# Patient Record
(demographics unavailable — no encounter records)

---

## 2024-10-24 NOTE — HISTORY OF PRESENT ILLNESS
[FreeTextEntry1] : Mr. MO is a 63 year old male with hx of Anti MPO renal limited vasculitis here for evaluation for ongoing ALONZO as a GN opinion last in May 2023.   In 2016, he had dx of ALONZO and hematuria and dx of anca vasculitis. 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 He had presented with sinusitis and hematuria and crt in 3.6 range. He had a + MPO titer. He was 1.8gm of proteinuria then. He had large kidenys in 13cm range and the mass was 3.2cm. He had milld IFTA and he got 4 doses of rituximab and steroids and did well with crt down to 1.0mg/dl range. He had bx read at [facility] and f.u with Dr Deejay Walls. He then got 4 more doses of rituxan over 1 year whenever his MPO titers josy. 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 The report does say sampling was a concern and only 1 glomeruli in the sample for IF but no active MPO disease. Since then, he has rising crt and now in 2.4 range and urine with hematuria and RBCS casts. He is here for potential re biopsy and opinion. New drugs- allopurinol since 2021 but otherwise mostly losartan, and bp meds. No hydralazine. No RA drugs. Never seen a rheumatologist. No lung findings. No recent COVID vaccine or infection. Most recent crt May 12th 2023- 2.4, 2gm of proteinuria and MPO titer is 7.7( very high in that range)-scanned  Since then, Pt was admitted, got 3 days of solumedrol 250mg IV pulse and 1gm of Rituxan as well Sent home on prednisone taper, dc crt was 2.5mg.dl. Sent on prednisone 60mg ( first week), 30mg ( second week), 25mg ( 2 weeks), 20mg( 2 weeks), 15mg ( 2weeks), 10mg (2 weeks), 5mg( 3 weeks). He got his second dose of rituxan as outpatient June 15th 2023. Overall, feels well, lost wt and feels lightheaded at home sometimes, bp running low He also has cramping at night of legs and thigh.  9/18/23: feeling overall well. Stopped prednisone and started avacopan early September. Tolerating avacopan well with no side effects. BP has been well controlled at home in the 120s.  Last labs from Aug 2023 show improvement in creat to 1.65 and downtrending proteinuria to 0.6  12/18/2023: Started Avacopan 30mg BID in Oct 2023. No side effects. Received 1st rituximab maintenance dose in Nov 2023. Remains off steroids. BP well controlled at home. Minimal edema/foamy urine.   June 2024- doing well. Proteinuria down trending. now on losartan 75mg po qd. BP doing well. 110-130 SBP at home No edema. ANCA titers improving. Planned for next Rituxan in july 2024  10/2024: Since his last visit: He received rituximab 1 g x1 on 7/11/2024 He has been compliant with avacopan, losartan, amlodipine He takes atovaquone ~ 3x/week Denies acute illness, hospitalizations Denies side effects from avacopan including GI symptoms, abd pain, rash, f/c, infections Home /70-80s  No recent labs

## 2024-10-24 NOTE — END OF VISIT
[] : Fellow [FreeTextEntry3] : Pt doing overall and encouraged to go back to [facility] nephrologist Dr Young and willem wren

## 2024-10-24 NOTE — ASSESSMENT
[FreeTextEntry1] : Mr. MO returns for follow up of renal limited MPO-ANVA vasculitis. 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 He had crescents and ANCA disease, minimal IFTA and started steroids in May and got two doses of rituxan two weeks apart, last dose Myriam 15, 2023. He got 2 doses of IV rituxan 1gm 2 weeks apart and mini pulse steroids 250mg IV X 3 doses. Followed by prednisone 60mg (first week), 30mg (second week), 25mg (2 weeks), 20mg (2 weeks), 15mg (2weeks), 10mg (2 weeks), 5mg (3 weeks).  Despite treatment, elevated Cr and proteinuria persisted (Cr 2.2 and uPCR 1.8 g/g). He was started on Avacopan in October 2023 and stopped prednisone. Additionally, he received rituximab 1g x1 in 7/2024. He has been tolerating treatment well and clinically responding to treatment. Most recent available Cr is down to 1.4 and MPO  is also trending down (26, from peak 50s).  1. MPO- ANCA vasculitis: Renal limited. Last relapse in 5/2023 now s/p rituximab (last dose 1g x1 in 7/2024) and avacopan started in 10/2023. He is tolerating treatment well without of signs or symptoms of side effects. He appears to be responding to treatment well with down-trending Cr and MPO titer. No recent uPCR. - Continue with avacopan, Losartan  - Will need another dose of rituximab, ~ 1/2025, will place order (for GI infusion center downstairs) - Check CMP, uPCR, UA, ANCA titers, CBC, LFTs - Advised to take atovaquone daily as prescribed  2. HTN: Well controlled on losartan and amlodipine. - Continue both for now - If edema worsens or becomes uncomfortable, will reduce dose of amlodipine - Advised low sodium diet - Advised to continue to monitor home BP  F/U in 4 months.

## 2024-10-24 NOTE — HISTORY OF PRESENT ILLNESS
[FreeTextEntry1] : Mr. MO is a 63 year old male with hx of Anti MPO renal limited vasculitis here for evaluation for ongoing ALONZO as a GN opinion last in May 2023.   In 2016, he had dx of ALONZO and hematuria and dx of anca vasculitis. 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 He had presented with sinusitis and hematuria and crt in 3.6 range. He had a + MPO titer. He was 1.8gm of proteinuria then. He had large kidenys in 13cm range and the mass was 3.2cm. He had milld IFTA and he got 4 doses of rituximab and steroids and did well with crt down to 1.0mg/dl range. He had bx read at [facility] and f.u with Dr Deejay Walls. He then got 4 more doses of rituxan over 1 year whenever his MPO titers josy. 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 The report does say sampling was a concern and only 1 glomeruli in the sample for IF but no active MPO disease. Since then, he has rising crt and now in 2.4 range and urine with hematuria and RBCS casts. He is here for potential re biopsy and opinion. New drugs- allopurinol since 2021 but otherwise mostly losartan, and bp meds. No hydralazine. No RA drugs. Never seen a rheumatologist. No lung findings. No recent COVID vaccine or infection. Most recent crt May 12th 2023- 2.4, 2gm of proteinuria and MPO titer is 7.7( very high in that range)-scanned  Since then, Pt was admitted, got 3 days of solumedrol 250mg IV pulse and 1gm of Rituxan as well Sent home on prednisone taper, dc crt was 2.5mg.dl. Sent on prednisone 60mg ( first week), 30mg ( second week), 25mg ( 2 weeks), 20mg( 2 weeks), 15mg ( 2weeks), 10mg (2 weeks), 5mg( 3 weeks). He got his second dose of rituxan as outpatient June 15th 2023. Overall, feels well, lost wt and feels lightheaded at home sometimes, bp running low He also has cramping at night of legs and thigh.  9/18/23: feeling overall well. Stopped prednisone and started avacopan early September. Tolerating avacopan well with no side effects. BP has been well controlled at home in the 120s.  Last labs from Aug 2023 show improvement in creat to 1.65 and downtrending proteinuria to 0.6  12/18/2023: Started Avacopan 30mg BID in Oct 2023. No side effects. Received 1st rituximab maintenance dose in Nov 2023. Remains off steroids. BP well controlled at home. Minimal edema/foamy urine.   June 2024- doing well. Proteinuria down trending. now on losartan 75mg po qd. BP doing well. 110-130 SBP at home No edema. ANCA titers improving. Planned for next Rituxan in july 2024  10/2024: Since his last visit: He received rituximab 1 g x1 on 7/11/2024 He has been compliant with avacopan, losartan, amlodipine He takes atovaquone ~ 3x/week Denies acute illness, hospitalizations Denies side effects from avacopan including GI symptoms, abd pain, rash, f/c, infections Home /70-80s  No recent labs

## 2025-01-10 NOTE — HISTORY OF PRESENT ILLNESS
[N/A] : N/A [Denies] : Denies [No] : No [Yes] : Yes [Informed consent documented in EHR.] : Informed consent documented in EHR. [TB] : Tuberculosis screening [Left upper extremity] : Left upper extremity [24g] : 24g [Start Time: ___] : Medication Start Time: [unfilled] [Medication Name: ___] : Medication Name: [unfilled] [Total Amount Administered: ___] : Total Amount Administered: [unfilled] [Patient  instructed to seek medical attention with signs and symptoms of adverse effects] : Patient  instructed to seek medical attention with signs and symptoms of adverse effects [Patient left unit in no acute distress] : Patient left unit in no acute distress [Medications administered as ordered and tolerated well.] : Medications administered as ordered and tolerated well. [End Time: ___] : Medication End Time: [unfilled] [IV discontinued. Intact. No signs or symptoms of IV complications noted. Time: ___] : IV discontinued. Intact. No signs or symptoms of IV complications noted. Time: [unfilled] [de-identified] : left AC [de-identified] : 8:27 AM [de-identified] : Patient presents for RItuxan infusion. Denies recent infection or hospitalization. Signs and symptoms of possible adverse reactions reinforced with patient. Pre-medications administered as ordered. Tolerated infusion well.

## 2025-01-10 NOTE — DISCUSSION/SUMMARY
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  Pt scheduled for Rituxan today  Pre medicated with IVF and pre meds -  D/w Dr Franco - no need for labs today  He is scheduled to f/u with Dr Franco in feb  He tolerated infusion well with no adverse effects

## 2025-02-12 NOTE — ASSESSMENT
[FreeTextEntry1] : Mr. MO returns for follow up of renal limited MPO-ANVA vasculitis. 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 He had crescents and ANCA disease, minimal IFTA and started steroids in May and got two doses of rituxan two weeks apart, last dose Myriam 15, 2023. He got 2 doses of IV rituxan 1gm 2 weeks apart and mini pulse steroids 250mg IV X 3 doses. Followed by prednisone 60mg (first week), 30mg (second week), 25mg (2 weeks), 20mg (2 weeks), 15mg (2weeks), 10mg (2 weeks), 5mg (3 weeks).  Despite treatment, elevated Cr and proteinuria persisted (Cr 2.2 and uPCR 1.8 g/g). He was started on Avacopan in October 2023 and stopped prednisone. Additionally, he received rituximab 1g x1 in 7/2024 and 1/2025. He has been tolerating treatment well and clinically responding to treatment. Most recent available Cr is down to 1.4 and MPO  is also trending down (26, from peak 50s).  1. MPO- ANCA vasculitis: Renal limited. Last relapse in 5/2023 now s/p rituximab x5 (last dose 1g x1 in 1/2025) and avacopan started in 10/2023. He is tolerating treatment well without of signs or symptoms of side effects. He appears to be responding to treatment well with down-trending Cr and MPO titer. No recent uPCR. - Continue with avacopan, Losartan  - Will aim for rituximab yearly for now based on clinical and laboratory assessment  - Check CMP, uPCR, UA, ANCA titers, CBC - LFTs for avacopan toxicity - Advised to take atovaquone daily as prescribed  2. HTN: In office BP 150s, on losartan and amlodipine. He has edema probably from amlodipine as his diet is low in sodium. - Continue both for now - If edema worsens or becomes uncomfortable, will reduce dose of amlodipine - Advised low sodium diet - Advised to continue to monitor home BP  F/U in 6 months.

## 2025-02-12 NOTE — HISTORY OF PRESENT ILLNESS
[FreeTextEntry1] : Mr. MO is a 64 year old male with hx of Anti MPO renal limited vasculitis here for evaluation for ongoing ALONZO as a GN opinion last in May 2023.   In 2016, he had dx of ALONZO and hematuria and dx of anca vasculitis. 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 He had presented with sinusitis and hematuria and crt in 3.6 range. He had a + MPO titer. He was 1.8gm of proteinuria then. He had large kidenys in 13cm range and the mass was 3.2cm. He had milld IFTA and he got 4 doses of rituximab and steroids and did well with crt down to 1.0mg/dl range. He had bx read at [facility] and f.u with Dr Deejay Walls. He then got 4 more doses of rituxan over 1 year whenever his MPO titers josy. 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 The report does say sampling was a concern and only 1 glomeruli in the sample for IF but no active MPO disease. Since then, he has rising crt and now in 2.4 range and urine with hematuria and RBCS casts. He is here for potential re biopsy and opinion. New drugs- allopurinol since 2021 but otherwise mostly losartan, and bp meds. No hydralazine. No RA drugs. Never seen a rheumatologist. No lung findings. No recent COVID vaccine or infection. Most recent crt May 12th 2023- 2.4, 2gm of proteinuria and MPO titer is 7.7( very high in that range)-scanned  Since then, Pt was admitted, got 3 days of solumedrol 250mg IV pulse and 1gm of Rituxan as well Sent home on prednisone taper, dc crt was 2.5mg.dl. Sent on prednisone 60mg ( first week), 30mg ( second week), 25mg ( 2 weeks), 20mg( 2 weeks), 15mg ( 2weeks), 10mg (2 weeks), 5mg( 3 weeks). He got his second dose of rituxan as outpatient June 15th 2023. Overall, feels well, lost wt and feels lightheaded at home sometimes, bp running low He also has cramping at night of legs and thigh.  9/18/23: feeling overall well. Stopped prednisone and started avacopan early September. Tolerating avacopan well with no side effects. BP has been well controlled at home in the 120s.  Last labs from Aug 2023 show improvement in creat to 1.65 and downtrending proteinuria to 0.6  12/18/2023: Started Avacopan 30mg BID in Oct 2023. No side effects. Received 1st rituximab maintenance dose in Nov 2023. Remains off steroids. BP well controlled at home. Minimal edema/foamy urine.   June 2024- doing well. Proteinuria down trending. now on losartan 75mg po qd. BP doing well. 110-130 SBP at home No edema. ANCA titers improving. Planned for next Rituxan in july 2024  10/2024: Since his last visit: He received rituximab 1 g x1 on 7/11/2024 He has been compliant with avacopan, losartan, amlodipine He takes atovaquone ~ 3x/week Denies acute illness, hospitalizations Denies side effects from avacopan including GI symptoms, abd pain, rash, f/c, infections Home /70-80s  2/2025 Since his last visit: He received rituximab 1 g x1 on 1/10/2025 (previous dose 7/11/2024) He has been compliant with avacopan, losartan, amlodipine He takes atovaquone ~ 3x/week Denies acute illness, hospitalizations Denies side effects from avacopan including GI symptoms, abd pain, rash, f/c, infections Home BP - does not check

## 2025-07-16 NOTE — HISTORY OF PRESENT ILLNESS
[FreeTextEntry1] : Mr. MO is a 64 year old male with hx of Anti MPO renal limited vasculitis here for here for follow up.  ANCA vasculitis history: 2016: Diagnosed with MPO-ANCA positive pauci-immune crescentic glomerulonephritis when he presented with ALONZO (creatinine 3.6 mg/dL), hematuria, sinusitis, proteinuria (1.8g).He was found to have a right renal oncocytoma resected; non-neoplastic tissue confirmed vasculitis.  Kidneys enlarged (13cm), mass 3.2 cm. Mild IFTA. MPO titer positive. Treated with rituximab (4 doses) and steroids, achieving remission (creatinine 1.0 mg/dL).   [id number]: Received additional rituximab doses based on rising MPO titers. Started allopurinol in 2021. Maintained on losartan and blood pressure medications.  January 2023: Relapse with rising creatinine (1.3-1.5 mg/dL) and MPO titers. Repeat renal biopsy: focal global glomerulosclerosis, mild IFTA, no active vasculitis (limited sampling).  May 2023: Creatinine 2.4 mg/dL, proteinuria 2g, elevated MPO titer (7.7). Hospitalized for IV steroids and rituximab. Discharged on prednisone taper.  June 2023: Second rituximab dose administered as outpatient. Creatinine 2.5 mg/dL at discharge. Reports weight loss and lightheadedness, leg cramps.  September 2023: Started avacopan, tolerating well.  Stopped prednisone. Blood pressure well controlled.  August 2023:Creatinine improved to 1.65 mg/dL, proteinuria decreased to 0.6g.  November 2023: Received rituximab maintenance dose.  Remains off steroids. Minimal edema/foamy urine.  June 2024:Doing well, proteinuria down trending.  On losartan 75mg daily. Tolerating avacopan. Blood pressure well controlled (110-130 mmHg systolic). No edema.  ANCA titers improving.  July 2024: Received rituximab 1g. Continues avacopan, losartan, amlodipine, and atovaquone prophylaxis.  October 2024: Continues medications, denies side effects. Home blood pressure 130/70-80s mmHg.  January 2025: He received rituximab 1 g x1 on 1/10/2025 (previous dose 7/11/2024). Continues medications as above. No reported side effects. Does not check blood pressure at home.  July 2025: Continued medications, tolerating well. BP is usually in the 130s/80s. Today it is 136/83mmhg.  He has been compliant with avacopan, losartan, amlodipine Denies acute illness, hospitalizations Denies side effects from avacopan including GI symptoms, abd pain, rash, f/c, infections current Meds: amlodipine 10, atovaquone 750, doxazosin 4mg, fenofibrate 48mg, losartan 75mg, avacopan 30 bid

## 2025-07-16 NOTE — PHYSICAL EXAM
[General Appearance - Alert] : alert [General Appearance - In No Acute Distress] : in no acute distress [General Appearance - Well Nourished] : well nourished [General Appearance - Well Developed] : well developed [Sclera] : the sclera and conjunctiva were normal [Outer Ear] : the ears and nose were normal in appearance [Neck Appearance] : the appearance of the neck was normal [] : no respiratory distress [Respiration, Rhythm And Depth] : normal respiratory rhythm and effort [Auscultation Breath Sounds / Voice Sounds] : lungs were clear to auscultation bilaterally [Heart Rate And Rhythm] : heart rate was normal and rhythm regular [Heart Sounds] : normal S1 and S2 [Abdomen Soft] : soft [No CVA Tenderness] : no ~M costovertebral angle tenderness [No Spinal Tenderness] : no spinal tenderness [Abnormal Walk] : normal gait [Skin Color & Pigmentation] : normal skin color and pigmentation [Oriented To Time, Place, And Person] : oriented to person, place, and time

## 2025-07-16 NOTE — ASSESSMENT
[FreeTextEntry1] : 64 year old male with hx of Anti MPO renal limited vasculitis here for here for follow up.   #MPO- ANCA vasculitis Renal limited. Last relapse in 5/2023 now s/p rituximab x5 (last dose 1g x1 in 1/2025) and avacopan started in 10/2023. He is tolerating treatment well without of signs or symptoms of side effects. He appears to be responding to treatment well with down-trending Cr and MPO titer.  Labs, Feb 2025 crt: 1.24, uPCr: 0.7 - Continue with avacopan, Losartan - Will aim for rituximab yearly for now based on clinical and laboratory assessment and continue with avacopan as we are for now given good clinical response - Check CMP, uPCR, UA, ANCA titers - LFTs for avacopan toxicity - Advised to take atovaquone daily as prescribed  #HTN: In office BP 150s, on losartan and amlodipine.   - Continue both for now  - Advised low sodium diet - Advised to continue to monitor home BP  F/U in 6 months.

## 2025-07-16 NOTE — REVIEW OF SYSTEMS
[Feeling Tired] : feeling tired [Negative] : Endocrine [FreeTextEntry2] : Feels tired in the afternoon, naps help